# Patient Record
Sex: MALE | Race: WHITE | URBAN - METROPOLITAN AREA
[De-identification: names, ages, dates, MRNs, and addresses within clinical notes are randomized per-mention and may not be internally consistent; named-entity substitution may affect disease eponyms.]

---

## 2017-04-14 ENCOUNTER — IMPORTED ENCOUNTER (OUTPATIENT)
Dept: URBAN - METROPOLITAN AREA CLINIC 9 | Facility: CLINIC | Age: 72
End: 2017-04-14

## 2018-04-09 ENCOUNTER — IMPORTED ENCOUNTER (OUTPATIENT)
Dept: URBAN - METROPOLITAN AREA CLINIC 9 | Facility: CLINIC | Age: 73
End: 2018-04-09

## 2019-04-08 ENCOUNTER — IMPORTED ENCOUNTER (OUTPATIENT)
Dept: URBAN - METROPOLITAN AREA CLINIC 9 | Facility: CLINIC | Age: 74
End: 2019-04-08

## 2020-06-09 ENCOUNTER — IMPORTED ENCOUNTER (OUTPATIENT)
Dept: URBAN - METROPOLITAN AREA CLINIC 9 | Facility: CLINIC | Age: 75
End: 2020-06-09

## 2021-06-10 ENCOUNTER — IMPORTED ENCOUNTER (OUTPATIENT)
Dept: URBAN - METROPOLITAN AREA CLINIC 9 | Facility: CLINIC | Age: 76
End: 2021-06-10

## 2021-10-16 ASSESSMENT — VISUAL ACUITY
OS_CC: 20/25 SN
OD_CC: 20/20 -2 SN
OS_CC: 20/20 - SN
OS_CC: 20/20 - SN
OD_CC: 20/20 SN
OD_SC: 20/40 SN
OD_CC: 20/20 - SN
OD_CC: 20/20 - SN
OS_SC: 20/30 SN
OD_CC: 20/20 SN
OD_CC: 20/25 -2 SN
OD_CC: 20/25 + SN
OS_SC: 20/30 - SN
OS_CC: 20/20 SN
OS_SC: 20/25 SN
OS_CC: 20/25 - SN
OD_CC: 20/20 - SN
OD_CC: 20/20 -2 SN
OD_SC: 20/400 + SN
OS_CC: 20/30 SN
OD_CC: 20/30 SN
OD_SC: 20/80 SN

## 2021-10-16 ASSESSMENT — KERATOMETRY
OS_AXISANGLE2_DEGREES: 90
OD_AXISANGLE_DEGREES: 81
OS_AXISANGLE2_DEGREES: 90
OS_AXISANGLE_DEGREES: 180
OD_K2POWER_DIOPTERS: 42.5
OS_K2POWER_DIOPTERS: 42.75
OD_K1POWER_DIOPTERS: 42.25
OD_K2POWER_DIOPTERS: 42.75
OS_AXISANGLE_DEGREES: 121
OD_K1POWER_DIOPTERS: 42
OD_K2POWER_DIOPTERS: 42.75
OD_AXISANGLE_DEGREES: 69
OS_K2POWER_DIOPTERS: 43
OS_K1POWER_DIOPTERS: 42.75
OD_AXISANGLE_DEGREES: 63
OS_AXISANGLE2_DEGREES: 168
OD_K1POWER_DIOPTERS: 41.625
OS_K2POWER_DIOPTERS: 42.875
OS_AXISANGLE2_DEGREES: 31
OS_K2POWER_DIOPTERS: 42.75
OD_K2POWER_DIOPTERS: 42.375
OD_AXISANGLE2_DEGREES: 171
OD_K1POWER_DIOPTERS: 42.25
OS_K1POWER_DIOPTERS: 42.75
OD_K1POWER_DIOPTERS: 42.25
OS_AXISANGLE2_DEGREES: 41
OS_K1POWER_DIOPTERS: 42.5
OS_K1POWER_DIOPTERS: 42.5
OD_K2POWER_DIOPTERS: 42.5
OS_K2POWER_DIOPTERS: 43
OS_AXISANGLE_DEGREES: 78
OD_AXISANGLE_DEGREES: 56
OS_AXISANGLE_DEGREES: 131
OD_AXISANGLE2_DEGREES: 153
OD_AXISANGLE2_DEGREES: 146
OS_AXISANGLE_DEGREES: 180
OD_AXISANGLE2_DEGREES: 161
OD_AXISANGLE_DEGREES: 71
OS_K1POWER_DIOPTERS: 43
OD_AXISANGLE2_DEGREES: 159

## 2021-10-16 ASSESSMENT — TONOMETRY
OS_IOP_MMHG: 14
OD_IOP_MMHG: 11
OS_IOP_MMHG: 19
OD_IOP_MMHG: 16
OD_IOP_MMHG: 16
OS_IOP_MMHG: 15
OD_IOP_MMHG: 19
OD_IOP_MMHG: 17
OS_IOP_MMHG: 16
OS_IOP_MMHG: 15

## 2022-06-24 RX ORDER — ALLOPURINOL 300 MG/1
TABLET ORAL
COMMUNITY

## 2022-06-24 RX ORDER — VENLAFAXINE 50 MG/1
TABLET ORAL
COMMUNITY

## 2022-06-24 RX ORDER — LEVOTHYROXINE SODIUM 0.07 MG/1
TABLET ORAL
COMMUNITY

## 2022-11-04 PROBLEM — G56.02 CARPAL TUNNEL SYNDROME ON LEFT: Status: ACTIVE | Noted: 2022-11-04

## 2024-08-27 ENCOUNTER — APPOINTMENT (RX ONLY)
Dept: URBAN - METROPOLITAN AREA CLINIC 19 | Facility: CLINIC | Age: 79
Setting detail: DERMATOLOGY
End: 2024-08-27

## 2024-08-27 DIAGNOSIS — L82.1 OTHER SEBORRHEIC KERATOSIS: ICD-10-CM

## 2024-08-27 DIAGNOSIS — Z87.2 PERSONAL HISTORY OF DISEASES OF THE SKIN AND SUBCUTANEOUS TISSUE: ICD-10-CM

## 2024-08-27 DIAGNOSIS — L57.8 OTHER SKIN CHANGES DUE TO CHRONIC EXPOSURE TO NONIONIZING RADIATION: ICD-10-CM

## 2024-08-27 DIAGNOSIS — Z85.828 PERSONAL HISTORY OF OTHER MALIGNANT NEOPLASM OF SKIN: ICD-10-CM

## 2024-08-27 DIAGNOSIS — D18.0 HEMANGIOMA: ICD-10-CM

## 2024-08-27 DIAGNOSIS — D485 NEOPLASM OF UNCERTAIN BEHAVIOR OF SKIN: ICD-10-CM

## 2024-08-27 DIAGNOSIS — L81.4 OTHER MELANIN HYPERPIGMENTATION: ICD-10-CM

## 2024-08-27 DIAGNOSIS — Z71.89 OTHER SPECIFIED COUNSELING: ICD-10-CM

## 2024-08-27 DIAGNOSIS — D22 MELANOCYTIC NEVI: ICD-10-CM

## 2024-08-27 PROBLEM — D48.5 NEOPLASM OF UNCERTAIN BEHAVIOR OF SKIN: Status: ACTIVE | Noted: 2024-08-27

## 2024-08-27 PROBLEM — D18.01 HEMANGIOMA OF SKIN AND SUBCUTANEOUS TISSUE: Status: ACTIVE | Noted: 2024-08-27

## 2024-08-27 PROBLEM — D22.9 MELANOCYTIC NEVI, UNSPECIFIED: Status: ACTIVE | Noted: 2024-08-27

## 2024-08-27 PROBLEM — D23.4 OTHER BENIGN NEOPLASM OF SKIN OF SCALP AND NECK: Status: ACTIVE | Noted: 2024-08-27

## 2024-08-27 PROCEDURE — 99203 OFFICE O/P NEW LOW 30 MIN: CPT | Mod: 25

## 2024-08-27 PROCEDURE — ? COUNSELING

## 2024-08-27 PROCEDURE — 11102 TANGNTL BX SKIN SINGLE LES: CPT

## 2024-08-27 PROCEDURE — ? PHOTO-DOCUMENTATION

## 2024-08-27 PROCEDURE — ? BIOPSY BY SHAVE METHOD

## 2024-08-27 ASSESSMENT — LOCATION SIMPLE DESCRIPTION DERM
LOCATION SIMPLE: LEFT THIGH
LOCATION SIMPLE: RIGHT FOREARM
LOCATION SIMPLE: RIGHT FOREHEAD
LOCATION SIMPLE: CHEST
LOCATION SIMPLE: LEFT UPPER ARM
LOCATION SIMPLE: INFERIOR FOREHEAD
LOCATION SIMPLE: RIGHT UPPER ARM
LOCATION SIMPLE: LOWER BACK
LOCATION SIMPLE: RIGHT POSTERIOR THIGH
LOCATION SIMPLE: ABDOMEN
LOCATION SIMPLE: RIGHT THIGH
LOCATION SIMPLE: RIGHT UPPER BACK
LOCATION SIMPLE: LEFT FOREARM
LOCATION SIMPLE: LEFT POSTERIOR THIGH
LOCATION SIMPLE: LEFT LIP
LOCATION SIMPLE: UPPER BACK

## 2024-08-27 ASSESSMENT — LOCATION ZONE DERM
LOCATION ZONE: LIP
LOCATION ZONE: FACE
LOCATION ZONE: ARM
LOCATION ZONE: TRUNK
LOCATION ZONE: LEG

## 2024-08-27 ASSESSMENT — LOCATION DETAILED DESCRIPTION DERM
LOCATION DETAILED: LEFT ANTERIOR PROXIMAL THIGH
LOCATION DETAILED: LEFT ANTERIOR PROXIMAL UPPER ARM
LOCATION DETAILED: INFERIOR THORACIC SPINE
LOCATION DETAILED: LEFT UPPER CUTANEOUS LIP
LOCATION DETAILED: INFERIOR MID FOREHEAD
LOCATION DETAILED: RIGHT PROXIMAL DORSAL FOREARM
LOCATION DETAILED: MIDDLE STERNUM
LOCATION DETAILED: RIGHT LATERAL SUPERIOR CHEST
LOCATION DETAILED: RIGHT ANTERIOR PROXIMAL UPPER ARM
LOCATION DETAILED: LEFT LATERAL ABDOMEN
LOCATION DETAILED: LEFT VENTRAL DISTAL FOREARM
LOCATION DETAILED: RIGHT VENTRAL PROXIMAL FOREARM
LOCATION DETAILED: RIGHT SUPERIOR UPPER BACK
LOCATION DETAILED: RIGHT ANTERIOR DISTAL THIGH
LOCATION DETAILED: RIGHT MEDIAL FOREHEAD
LOCATION DETAILED: RIGHT MEDIAL UPPER BACK
LOCATION DETAILED: LEFT DISTAL POSTERIOR THIGH
LOCATION DETAILED: EPIGASTRIC SKIN
LOCATION DETAILED: RIGHT DISTAL POSTERIOR THIGH
LOCATION DETAILED: INFERIOR LUMBAR SPINE

## 2024-08-28 ENCOUNTER — APPOINTMENT (RX ONLY)
Dept: URBAN - METROPOLITAN AREA CLINIC 20 | Facility: CLINIC | Age: 79
Setting detail: DERMATOLOGY
End: 2024-08-28

## 2024-12-02 ENCOUNTER — APPOINTMENT (RX ONLY)
Dept: URBAN - METROPOLITAN AREA CLINIC 19 | Facility: CLINIC | Age: 79
Setting detail: DERMATOLOGY
End: 2024-12-02

## 2024-12-02 DIAGNOSIS — L60.8 OTHER NAIL DISORDERS: ICD-10-CM

## 2024-12-02 DIAGNOSIS — B35.1 TINEA UNGUIUM: ICD-10-CM | Status: INADEQUATELY CONTROLLED

## 2024-12-02 DIAGNOSIS — D485 NEOPLASM OF UNCERTAIN BEHAVIOR OF SKIN: ICD-10-CM

## 2024-12-02 PROBLEM — D48.5 NEOPLASM OF UNCERTAIN BEHAVIOR OF SKIN: Status: ACTIVE | Noted: 2024-12-02

## 2024-12-02 PROCEDURE — ? COUNSELING

## 2024-12-02 PROCEDURE — ? BIOPSY BY SHAVE METHOD

## 2024-12-02 PROCEDURE — 99213 OFFICE O/P EST LOW 20 MIN: CPT | Mod: 25

## 2024-12-02 PROCEDURE — ? INVENTORY

## 2024-12-02 PROCEDURE — 11102 TANGNTL BX SKIN SINGLE LES: CPT

## 2024-12-02 ASSESSMENT — LOCATION ZONE DERM
LOCATION ZONE: TOENAIL
LOCATION ZONE: TOE
LOCATION ZONE: ARM

## 2024-12-02 ASSESSMENT — LOCATION DETAILED DESCRIPTION DERM
LOCATION DETAILED: RIGHT 3RD TOENAIL
LOCATION DETAILED: LEFT DISTAL PLANTAR 3RD TOE
LOCATION DETAILED: RIGHT 2ND TOENAIL
LOCATION DETAILED: RIGHT DISTAL PLANTAR GREAT TOE
LOCATION DETAILED: LEFT GREAT TOENAIL
LOCATION DETAILED: LEFT DISTAL PLANTAR 2ND TOE
LOCATION DETAILED: LEFT POSTERIOR SHOULDER

## 2024-12-02 ASSESSMENT — LOCATION SIMPLE DESCRIPTION DERM
LOCATION SIMPLE: LEFT 3RD TOE
LOCATION SIMPLE: LEFT GREAT TOE
LOCATION SIMPLE: LEFT 2ND TOE
LOCATION SIMPLE: RIGHT 2ND TOE
LOCATION SIMPLE: RIGHT GREAT TOE
LOCATION SIMPLE: RIGHT 3RD TOE
LOCATION SIMPLE: LEFT SHOULDER

## 2024-12-02 ASSESSMENT — LESION DIAMETER IN CM
PLEASE MEASURE THE GREATEST DIAMETER IN CM. IF MORE THAN ONE LESION, PLEASE SUM THE GREATEST DIAMETER OF ALL TREATED LESIONS.: 1.1

## 2024-12-02 ASSESSMENT — NAIL INVOLVEMENT PERCENT: % OF NAIL(S) INVOLVED: 1

## 2024-12-02 NOTE — PROCEDURE: BIOPSY BY SHAVE METHOD
Detail Level: Detailed
Depth Of Biopsy: dermis
Was A Bandage Applied: Yes
Size Of Lesion In Cm: 1.1
X Size Of Lesion In Cm: 0
Biopsy Type: H and E
Biopsy Method: double edge Personna blade
Anesthesia Type: 1% lidocaine with epinephrine
Anesthesia Volume In Cc: 0.5
Hemostasis: Aluminum Chloride
Wound Care: Petrolatum
Dressing: Band-Aid
Destruction After The Procedure: No
Type Of Destruction Used: Curettage
Curettage Text: The wound bed was treated with curettage after the biopsy was performed.
Cryotherapy Text: The wound bed was treated with cryotherapy after the biopsy was performed.
Electrodesiccation Text: The wound bed was treated with electrodesiccation after the biopsy was performed.
Electrodesiccation And Curettage Text: The wound bed was treated with electrodesiccation and curettage after the biopsy was performed.
Silver Nitrate Text: The wound bed was treated with silver nitrate after the biopsy was performed.
Lab: [id number]
Lab Facility: [id number]
Consent: Written consent was obtained and risks were reviewed including but not limited to scarring, infection, bleeding, scabbing, incomplete removal, nerve damage and allergy to anesthesia. With shared decision making, the patient agreed to proceed.
Post-Care Instructions: The patient was instructed on proper post-care to the biopsy site (s). Leave the bandage in place with the site clean and dry for approximately 24 hours. Then, remove the bandage, clean with mild soap and water, and dry the site, apply fresh petrolatum (Vaseline or Aquaphor), and a new bandage daily until the site is fully healed.
Notification Instructions: Patient will be notified of biopsy results. However, patient instructed to call the office if not contacted within 2 weeks.
Billing Type: Third-Party Bill
Information: Selecting Yes will display possible errors in your note based on the variables you have selected. This validation is only offered as a suggestion for you. PLEASE NOTE THAT THE VALIDATION TEXT WILL BE REMOVED WHEN YOU FINALIZE YOUR NOTE. IF YOU WANT TO FAX A PRELIMINARY NOTE YOU WILL NEED TO TOGGLE THIS TO 'NO' IF YOU DO NOT WANT IT IN YOUR FAXED NOTE.